# Patient Record
Sex: FEMALE | Race: WHITE | NOT HISPANIC OR LATINO | URBAN - METROPOLITAN AREA
[De-identification: names, ages, dates, MRNs, and addresses within clinical notes are randomized per-mention and may not be internally consistent; named-entity substitution may affect disease eponyms.]

---

## 2017-04-24 ENCOUNTER — LAB VISIT (OUTPATIENT)
Dept: LAB | Facility: HOSPITAL | Age: 32
End: 2017-04-24
Attending: FAMILY MEDICINE
Payer: COMMERCIAL

## 2017-04-24 ENCOUNTER — OFFICE VISIT (OUTPATIENT)
Dept: FAMILY MEDICINE | Facility: CLINIC | Age: 32
End: 2017-04-24
Attending: FAMILY MEDICINE
Payer: COMMERCIAL

## 2017-04-24 VITALS
SYSTOLIC BLOOD PRESSURE: 100 MMHG | DIASTOLIC BLOOD PRESSURE: 62 MMHG | HEART RATE: 87 BPM | HEIGHT: 65 IN | OXYGEN SATURATION: 98 % | BODY MASS INDEX: 16.28 KG/M2 | WEIGHT: 97.69 LBS

## 2017-04-24 DIAGNOSIS — F32.1 MODERATE MAJOR DEPRESSION: ICD-10-CM

## 2017-04-24 DIAGNOSIS — R63.0 DECREASED APPETITE: ICD-10-CM

## 2017-04-24 DIAGNOSIS — F41.9 MILD ANXIETY: ICD-10-CM

## 2017-04-24 DIAGNOSIS — R63.4 WEIGHT LOSS, UNINTENTIONAL: ICD-10-CM

## 2017-04-24 DIAGNOSIS — F12.90 MARIJUANA USE, CONTINUOUS: ICD-10-CM

## 2017-04-24 DIAGNOSIS — R63.4 WEIGHT LOSS, UNINTENTIONAL: Primary | ICD-10-CM

## 2017-04-24 LAB
25(OH)D3+25(OH)D2 SERPL-MCNC: 41 NG/ML
ALBUMIN SERPL BCP-MCNC: 4.7 G/DL
ALP SERPL-CCNC: 76 U/L
ALT SERPL W/O P-5'-P-CCNC: 12 U/L
ANION GAP SERPL CALC-SCNC: 11 MMOL/L
AST SERPL-CCNC: 17 U/L
BASOPHILS # BLD AUTO: 0.04 K/UL
BASOPHILS NFR BLD: 0.5 %
BILIRUB SERPL-MCNC: 0.7 MG/DL
BUN SERPL-MCNC: 14 MG/DL
CALCIUM SERPL-MCNC: 9.6 MG/DL
CHLORIDE SERPL-SCNC: 109 MMOL/L
CHOLEST/HDLC SERPL: 2.1 {RATIO}
CO2 SERPL-SCNC: 21 MMOL/L
CREAT SERPL-MCNC: 0.8 MG/DL
DIFFERENTIAL METHOD: ABNORMAL
EOSINOPHIL # BLD AUTO: 0.1 K/UL
EOSINOPHIL NFR BLD: 1.5 %
ERYTHROCYTE [DISTWIDTH] IN BLOOD BY AUTOMATED COUNT: 12.6 %
EST. GFR  (AFRICAN AMERICAN): >60 ML/MIN/1.73 M^2
EST. GFR  (NON AFRICAN AMERICAN): >60 ML/MIN/1.73 M^2
GLUCOSE SERPL-MCNC: 78 MG/DL
HCT VFR BLD AUTO: 41.6 %
HDL/CHOLESTEROL RATIO: 48.5 %
HDLC SERPL-MCNC: 134 MG/DL
HDLC SERPL-MCNC: 65 MG/DL
HGB BLD-MCNC: 13.5 G/DL
LDLC SERPL CALC-MCNC: 62.8 MG/DL
LYMPHOCYTES # BLD AUTO: 3.7 K/UL
LYMPHOCYTES NFR BLD: 50.1 %
MCH RBC QN AUTO: 30.5 PG
MCHC RBC AUTO-ENTMCNC: 32.5 %
MCV RBC AUTO: 94 FL
MONOCYTES # BLD AUTO: 0.5 K/UL
MONOCYTES NFR BLD: 7 %
NEUTROPHILS # BLD AUTO: 3 K/UL
NEUTROPHILS NFR BLD: 40.8 %
NONHDLC SERPL-MCNC: 69 MG/DL
PLATELET # BLD AUTO: 401 K/UL
PMV BLD AUTO: 9.8 FL
POTASSIUM SERPL-SCNC: 4.2 MMOL/L
PROT SERPL-MCNC: 7.5 G/DL
RBC # BLD AUTO: 4.43 M/UL
SODIUM SERPL-SCNC: 141 MMOL/L
T4 FREE SERPL-MCNC: 1.11 NG/DL
TRIGL SERPL-MCNC: 31 MG/DL
TSH SERPL DL<=0.005 MIU/L-ACNC: 1.01 UIU/ML
WBC # BLD AUTO: 7.29 K/UL

## 2017-04-24 PROCEDURE — 84443 ASSAY THYROID STIM HORMONE: CPT

## 2017-04-24 PROCEDURE — 82306 VITAMIN D 25 HYDROXY: CPT

## 2017-04-24 PROCEDURE — 99214 OFFICE O/P EST MOD 30 MIN: CPT | Mod: S$GLB,,, | Performed by: FAMILY MEDICINE

## 2017-04-24 PROCEDURE — 80061 LIPID PANEL: CPT

## 2017-04-24 PROCEDURE — 84439 ASSAY OF FREE THYROXINE: CPT

## 2017-04-24 PROCEDURE — 85025 COMPLETE CBC W/AUTO DIFF WBC: CPT

## 2017-04-24 PROCEDURE — 99999 PR PBB SHADOW E&M-EST. PATIENT-LVL III: CPT | Mod: PBBFAC,,, | Performed by: FAMILY MEDICINE

## 2017-04-24 PROCEDURE — 1160F RVW MEDS BY RX/DR IN RCRD: CPT | Mod: S$GLB,,, | Performed by: FAMILY MEDICINE

## 2017-04-24 PROCEDURE — 36415 COLL VENOUS BLD VENIPUNCTURE: CPT | Mod: PO

## 2017-04-24 PROCEDURE — 80053 COMPREHEN METABOLIC PANEL: CPT

## 2017-04-24 NOTE — PROGRESS NOTES
"Subjective:       Patient ID: Susan Rueda is a 31 y.o. female.    Chief Complaint: Anorexia    HPI     The patient presents to the office today with complaints of decreased appetite, with a concomitant weight loss over the past few months.  She really presents to this office in 2015 with similar complaints.  However, her weight is now 12-13 pounds less.  She denies any prior history of eating disorders.  At her only previous visit, I did feel the patient was having some mild anxiety and moderate major depression.  She had a "bad experience" with an SSRI prior to that, it was not open to counseling.  She had been prescribed by [facility] my another physician, which did help improve her "appetite" and that she can regain some of her weight.    Today, the patient does note some significant stress in her life.  She notes work stress, is willing to help her  deal with the loss of his mother in January of this year, and the loss of a friend/counselor.    The patient again declines antidepressants, but says she would "be okay" with an antianxiety agent.  She appears open counseling.  However, she repeatedly states that she just needs something to help with her appetite.  She notes the majority of her "stress" comes from losing weight.  She does not appear to be open the possibility that her anxiety/depression is affecting her appetite.      Patient Active Problem List   Diagnosis    Marijuana use, continuous    Dysmenorrhea    History of HPV infection    AR (allergic rhinitis)    Mild anxiety    Moderate major depression         Current Outpatient Prescriptions:     fexofenadine (ALLEGRA) 180 MG tablet, Take 1 tablet (180 mg total) by mouth once daily., Disp: 30 tablet, Rfl: 2    fluticasone (FLONASE) 50 mcg/actuation nasal spray, 2 sprays by Each Nare route once daily., Disp: 16 g, Rfl: 2    megestrol (MEGACE) 400 mg/10 mL (40 mg/mL) Susp, , Disp: , Rfl: 2    The following portions of the patient's " history were reviewed and updated as appropriate: allergies, past family history, past medical history, past social history and past surgical history.    The following portions of the patient's history were reviewed and updated as appropriate: allergies, current medications, past family history, past medical history, past social history, past surgical history and problem list.      Review of Systems   Constitutional: Positive for appetite change, fatigue and unexpected weight change.   HENT: Negative for ear discharge, ear pain, hearing loss, tinnitus and voice change.    Eyes: Negative.    Respiratory: Negative for cough and shortness of breath.    Cardiovascular: Negative for chest pain, palpitations and leg swelling.   Gastrointestinal: Negative for abdominal pain, blood in stool, constipation, diarrhea, nausea and vomiting.   Genitourinary: Negative for dyspareunia, dysuria, frequency, hematuria, menstrual problem, vaginal bleeding and vaginal discharge.   Musculoskeletal: Negative for arthralgias, back pain and myalgias.   Skin: Negative.    Neurological: Positive for dizziness, light-headedness and headaches. Negative for weakness.   Hematological: Does not bruise/bleed easily.   Psychiatric/Behavioral: Positive for dysphoric mood. Negative for sleep disturbance. The patient is nervous/anxious.        Objective:      Physical Exam   Constitutional: She is oriented to person, place, and time. Vital signs are normal. She is cooperative. She does not appear ill. She appears distressed.   Extremely thin; minimal body fat.   HENT:   Head: Normocephalic.   Mouth/Throat: Oropharynx is clear and moist. No oropharyngeal exudate.   Eyes: Conjunctivae are normal. No scleral icterus.   Neck: Neck supple. No thyromegaly present.   Cardiovascular: Normal rate, regular rhythm and normal heart sounds.  Exam reveals no gallop.    No murmur heard.  Pulmonary/Chest: Effort normal and breath sounds normal. She has no wheezes. She  "has no rales.   Abdominal: Soft. Bowel sounds are normal. She exhibits no distension and no mass. There is no tenderness.   Musculoskeletal: She exhibits no edema.   Lymphadenopathy:     She has no cervical adenopathy.   Neurological: She is alert and oriented to person, place, and time.   Skin: Skin is warm and dry.         Assessment:       1. Weight loss, unintentional    2. Decreased appetite    3. Marijuana use, continuous    4. Mild anxiety    5. Moderate major depression          Plan:       Long discussion with patient concerning her presenting complaints.  She again is not open to the idea that anxiety/depression is contributing to her weight loss/decreased appetite.  We did agree to check some laboratory investigation, rule out metabolic disturbances as the cause of patient's weight loss.  She declines referral for counseling.  We will call the patient with results & make further recommendations at that time.      "This note will not be shared with the patient."  "

## 2017-04-25 ENCOUNTER — PATIENT MESSAGE (OUTPATIENT)
Dept: FAMILY MEDICINE | Facility: CLINIC | Age: 32
End: 2017-04-25

## 2017-04-25 DIAGNOSIS — F34.1 DYSTHYMIC DISORDER: ICD-10-CM

## 2017-04-25 DIAGNOSIS — R63.4 UNINTENDED WEIGHT LOSS: Primary | ICD-10-CM

## 2017-04-28 NOTE — TELEPHONE ENCOUNTER
Patient states that she is having trouble replying to your message via the portal, but she was able to read your message.  Patient states that she is in agreement with your recommendations.  She is asking that if you need to speak with her please give her a call on her cell phone. Or I am happy to call her with additional information.

## 2017-04-29 RX ORDER — MEGESTROL ACETATE 40 MG/ML
400 SUSPENSION ORAL DAILY
Qty: 240 ML | Refills: 0 | Status: SHIPPED | OUTPATIENT
Start: 2017-04-29

## 2017-04-29 NOTE — TELEPHONE ENCOUNTER
I will prescribe megestrol, as well as low-dose citalopram.  Referral to the nutritionist entered, as well as to psychiatry.  Please facilitate these appointments, and schedule follow-up visit with me in 2 to 3 weeks.

## 2017-05-01 NOTE — TELEPHONE ENCOUNTER
Mindy, please contact patient to facilitate an appt with the nutritionist along with a follow up visit with Dr. Quevedo in 3 weeks. Thanks!

## 2017-05-02 NOTE — TELEPHONE ENCOUNTER
I spoke with Susan, she said she just lost her job and she can't afford to go to any appointments right now.